# Patient Record
Sex: MALE | Race: ASIAN | Employment: FULL TIME | URBAN - METROPOLITAN AREA
[De-identification: names, ages, dates, MRNs, and addresses within clinical notes are randomized per-mention and may not be internally consistent; named-entity substitution may affect disease eponyms.]

---

## 2024-10-31 ENCOUNTER — OFFICE VISIT (OUTPATIENT)
Dept: FAMILY MEDICINE CLINIC | Facility: CLINIC | Age: 39
End: 2024-10-31
Payer: COMMERCIAL

## 2024-10-31 ENCOUNTER — HOSPITAL ENCOUNTER (OUTPATIENT)
Dept: GENERAL RADIOLOGY | Age: 39
Discharge: HOME | End: 2024-10-31
Attending: FAMILY MEDICINE
Payer: COMMERCIAL

## 2024-10-31 VITALS
HEART RATE: 70 BPM | HEIGHT: 68 IN | BODY MASS INDEX: 34.1 KG/M2 | SYSTOLIC BLOOD PRESSURE: 124 MMHG | WEIGHT: 225 LBS | RESPIRATION RATE: 18 BRPM | OXYGEN SATURATION: 98 % | DIASTOLIC BLOOD PRESSURE: 74 MMHG

## 2024-10-31 DIAGNOSIS — R73.03 PREDIABETES: ICD-10-CM

## 2024-10-31 DIAGNOSIS — Z00.00 WELLNESS EXAMINATION: Primary | ICD-10-CM

## 2024-10-31 DIAGNOSIS — I83.90 VARICOSE VEIN: ICD-10-CM

## 2024-10-31 DIAGNOSIS — M79.672 PAIN OF LEFT HEEL: ICD-10-CM

## 2024-10-31 DIAGNOSIS — Z00.00 LABORATORY EXAM ORDERED AS PART OF ROUTINE GENERAL MEDICAL EXAMINATION: ICD-10-CM

## 2024-10-31 PROCEDURE — 73650 X-RAY EXAM OF HEEL: CPT | Performed by: FAMILY MEDICINE

## 2024-10-31 NOTE — PATIENT INSTRUCTIONS
Health Screening Guidelines, Men Ages 18 to 39   Screening tests and health counseling are a key part of managing your health. A screening test is done to find disorders or diseases in people who don't have any symptoms. Screening tests are not used to diagnose. They are used to find out if more testing is needed. The goal may be to find a disease early so it can be treated with more success. Or the goal may be to find a disease early so you can make lifestyle changes. You may need regular checkups to help you reduce your risk of disease.   Below are guidelines for men ages 18 to 39. Talk with your healthcare provider. Make sure you’re up-to-date on what you need.   We understand gender is a spectrum. We may use gendered terms to talk about anatomy and health risk. Please use this information in a way that works best for you and your provider as you talk about your care.   Screening  Who needs it  How often    Alcohol misuse All men in this age group  At routine exams   Blood pressure All men in this age group  Once a year if your blood pressure is normal. Normal blood pressure is less than 120/80 mm Hg. If your blood pressure is higher than this, follow the advice of your healthcare provider.    Prediabetes and type 2 diabetes  Men ages 35 to 70 who are overweight or obese  At least every 3 years (yearly if blood sugar has already started to rise)    Hepatitis C All men ages 18 to 79  At routine exams   High cholesterol or triglycerides  All men ages 20 and older, and younger men at high risk for coronary artery disease.  At least every 5 years    HIV All men At routine exams   Obesity All men in this age group  At routine exams   Syphilis Men at higher risk for infection. Talk with your healthcare provider.  At routine exams   Tuberculosis Men at higher risk for infection. Talk with your healthcare provider.  Ask your healthcare provider    Vision All men in this age group  Every 5 to 10 years if no risk factors  for eye disease    Health counseling  Who needs it  How often    Diet and exercise All men in this age group  At routine exams   Use of tobacco and the health effects it can cause  All men in this age group  Every visit   Sexually transmitted infection (STI) prevention  Men who are sexually active  At routine exams   Skin cancer All men in this age group  At routine exams. You may be reminded to avoid outdoor tanning and tanning beds.    [employer] last reviewed this educational content on 7/1/2022 © 2000-2023 [employer]. All rights reserved. This information is not intended as a substitute for professional medical care. Always follow your healthcare professional's instructions.

## 2024-10-31 NOTE — PROGRESS NOTES
HPI:   Ann presents for an annual wellness visit.     Reports L heel pain ongoing for the past few weeks. No trauma. Has not tried orthotics, switching shoes. Does report sharp pains when walking/standing for prolonged periods. No radiation. No swelling, skin changes.     Has varicose veins b/l. Wants to get treatment for this. Does report pain, heavy sensation in legs when walking/standing for prolonged periods.     Allergies:   No Known Allergies        CURRENT MEDICATIONS    Cholecalciferol (VITAMIN D3) 25 MCG (1000 UT) Oral Cap       Multiple Vitamin (MULTIVITAMIN ADULT OR)       Esomeprazole Magnesium (NEXIUM 24HR) 20 MG Oral Capsule Delayed Release           HISTORICAL INFORMATION   History reviewed. No pertinent past medical history.     History reviewed. No pertinent surgical history.        SOCIAL HISTORY   Social Drivers of Health     Tobacco Use: Low Risk  (10/31/2024)    Patient History     Smoking Tobacco Use: Never     Smokeless Tobacco Use: Never     Passive Exposure: Not on file   Financial Resource Strain: Not on file   Food Insecurity: Not on file   Transportation Needs: Not on file   Physical Activity: Not on file   Stress: Not on file   Social Connections: Not on file   Domestic Safety: Unknown (8/31/2024)    Domestic Safety     Domestic Safety - Pt Reported (Most Recent Flow): Not on file     Domestic Safety - Signs of abuse/neglect: Not on file   Depression: Not at risk (10/31/2024)    PHQ-2     PHQ-2 Score: 0   Housing Stability: Not on file   Utilities: Not on file   Access to Medications: Not on file   Health Literacy: Not on file            REVIEW OF SYSTEMS:     Constitutional: negative  Eyes: negative  ENT: negative  Respiratory: negative  Cardiovascular: negative  Gastrointestinal: negative  Integument/Breast: see HPI  Genitourinary: negative  Heme/Lymph: negative  Musculoskeletal: see HPI  Neurological: negative  Psych: negative  Endocrine: negative  Allergic/Immune:  negative    EXAM:     Vitals:    10/31/24 1404   BP: 124/74   Pulse: 70   Resp: 18     Body mass index is 34.21 kg/m².  Wt Readings from Last 6 Encounters:   10/31/24 225 lb (102.1 kg)         General: alert, appears stated age, and cooperative, obese  Head: Normocephalic, without obvious abnormality, atraumatic  Eyes: negative  Ears: normal TM's and external ear canals both ears  Nose: Nares normal. Septum midline. Mucosa normal. No drainage or sinus tenderness.  Throat: lips, mucosa, and tongue normal; teeth and gums normal  Neck: no adenopathy, supple, symmetrical, trachea midline, and thyroid not enlarged, symmetric, no tenderness/mass/nodules  Heart: S1, S2 normal, no murmur, click, rub or gallop, regular rate and rhythm  Lungs: clear to auscultation bilaterally  Chest wall: no tenderness  Abdomen: soft, non-tender; bowel sounds normal; no masses,  no organomegaly  : deferred  Back: negative  Extremities: extremities normal, atraumatic, no cyanosis or edema. Tenderness at medial aspect of L heel. No erythema warmth swelling noted. L foot ROM wnl. Strength wnl  Pulses: 2+ and symmetric  Skin: b/l varicose veins  Lymph Nodes: No LAD  Neurologic: Grossly normal    ASSESSMENT AND PLAN:   Clem was seen today for physical.    Diagnoses and all orders for this visit:    1. Wellness examination  -Immunizations: Declines vaccines    -Metabolic: BMI 34. BP wnl. Due for annual labs   -Cancer screening: none indicated   -Communicable disease: low risk   -Mental health: no concerns   -Other preventative: follow with dentistry and optometry.   -Lifestyle: Follow a well balanced healthy diet with emphasis on fruits, vegetables, whole grains, lean meats. Limit processed and junk foods. Aim for at least 150 minutes of moderate intensity exercise weekly. Make sure you are staying adequately hydrated. Aim to get 7-9 hours of sleep nightly.       2. Laboratory exam ordered as part of routine general medical examination  - CBC  With Differential With Platelet; Future  - Comp Metabolic Panel (14); Future  - Lipid Panel; Future  - TSH W Reflex To Free T4; Future    3. Varicose vein  Refer to surgery   - Surgery Referral - In Network    4. Pain of left heel  Will check XR  Switch to supportive shoes. Can try otc orthotics for added support. Avoid prolonged standing/walking. Advised on ice, nsaids as needed.   Consider podiatry referral if needed.   - XR HEEL (CALCANEUS) (MIN 2 VIEWS), LEFT (CPT=73650); Future    5. Prediabetes  Stable, check A1c. Follow low carb diet, regular exercise.   - Hemoglobin A1C; Future          Health Maintenance:  Health Maintenance Due   Topic Date Due    Annual Physical  Never done    DTaP,Tdap,and Td Vaccines (1 - Tdap) Never done    COVID-19 Vaccine (1 - 2023-24 season) Never done    Influenza Vaccine (1) Never done       Patient/Caregiver Education: Patient/Caregiver Education: There are no barriers to learning. Medical education done.   Outcome: Patient verbalizes understanding. Patient is notified to call with any questions, complications, allergies, or worsening or changing symptoms.  Patient is to call with any side effects or complications from the treatments as a result of today.     Problem List:  There is no problem list on file for this patient.

## 2024-11-01 ENCOUNTER — MED REC SCAN ONLY (OUTPATIENT)
Dept: FAMILY MEDICINE CLINIC | Facility: CLINIC | Age: 39
End: 2024-11-01

## 2024-12-21 ENCOUNTER — LAB ENCOUNTER (OUTPATIENT)
Dept: LAB | Age: 39
End: 2024-12-21
Attending: FAMILY MEDICINE
Payer: COMMERCIAL

## 2024-12-21 DIAGNOSIS — Z00.00 LABORATORY EXAM ORDERED AS PART OF ROUTINE GENERAL MEDICAL EXAMINATION: ICD-10-CM

## 2024-12-21 DIAGNOSIS — R73.03 PREDIABETES: ICD-10-CM

## 2024-12-21 LAB
ALBUMIN SERPL-MCNC: 4.1 G/DL (ref 3.2–4.8)
ALBUMIN/GLOB SERPL: 1.3 {RATIO} (ref 1–2)
ALP LIVER SERPL-CCNC: 75 U/L
ALT SERPL-CCNC: 21 U/L
ANION GAP SERPL CALC-SCNC: 4 MMOL/L (ref 0–18)
AST SERPL-CCNC: 25 U/L (ref ?–34)
BASOPHILS # BLD AUTO: 0.06 X10(3) UL (ref 0–0.2)
BASOPHILS NFR BLD AUTO: 1.3 %
BILIRUB SERPL-MCNC: 0.4 MG/DL (ref 0.3–1.2)
BUN BLD-MCNC: 16 MG/DL (ref 9–23)
CALCIUM BLD-MCNC: 9.4 MG/DL (ref 8.7–10.4)
CHLORIDE SERPL-SCNC: 108 MMOL/L (ref 98–112)
CHOLEST SERPL-MCNC: 149 MG/DL (ref ?–200)
CO2 SERPL-SCNC: 28 MMOL/L (ref 21–32)
CREAT BLD-MCNC: 1.04 MG/DL
EGFRCR SERPLBLD CKD-EPI 2021: 94 ML/MIN/1.73M2 (ref 60–?)
EOSINOPHIL # BLD AUTO: 0.31 X10(3) UL (ref 0–0.7)
EOSINOPHIL NFR BLD AUTO: 6.9 %
ERYTHROCYTE [DISTWIDTH] IN BLOOD BY AUTOMATED COUNT: 13.1 %
EST. AVERAGE GLUCOSE BLD GHB EST-MCNC: 120 MG/DL (ref 68–126)
FASTING PATIENT LIPID ANSWER: YES
FASTING STATUS PATIENT QL REPORTED: YES
GLOBULIN PLAS-MCNC: 3.2 G/DL (ref 2–3.5)
GLUCOSE BLD-MCNC: 98 MG/DL (ref 70–99)
HBA1C MFR BLD: 5.8 % (ref ?–5.7)
HCT VFR BLD AUTO: 42.7 %
HDLC SERPL-MCNC: 44 MG/DL (ref 40–59)
HGB BLD-MCNC: 14.1 G/DL
IMM GRANULOCYTES # BLD AUTO: 0.01 X10(3) UL (ref 0–1)
IMM GRANULOCYTES NFR BLD: 0.2 %
LDLC SERPL CALC-MCNC: 89 MG/DL (ref ?–100)
LYMPHOCYTES # BLD AUTO: 1.25 X10(3) UL (ref 1–4)
LYMPHOCYTES NFR BLD AUTO: 27.8 %
MCH RBC QN AUTO: 28.4 PG (ref 26–34)
MCHC RBC AUTO-ENTMCNC: 33 G/DL (ref 31–37)
MCV RBC AUTO: 85.9 FL
MONOCYTES # BLD AUTO: 0.32 X10(3) UL (ref 0.1–1)
MONOCYTES NFR BLD AUTO: 7.1 %
NEUTROPHILS # BLD AUTO: 2.54 X10 (3) UL (ref 1.5–7.7)
NEUTROPHILS # BLD AUTO: 2.54 X10(3) UL (ref 1.5–7.7)
NEUTROPHILS NFR BLD AUTO: 56.7 %
NONHDLC SERPL-MCNC: 105 MG/DL (ref ?–130)
OSMOLALITY SERPL CALC.SUM OF ELEC: 291 MOSM/KG (ref 275–295)
PLATELET # BLD AUTO: 214 10(3)UL (ref 150–450)
POTASSIUM SERPL-SCNC: 4.2 MMOL/L (ref 3.5–5.1)
PROT SERPL-MCNC: 7.3 G/DL (ref 5.7–8.2)
RBC # BLD AUTO: 4.97 X10(6)UL
SODIUM SERPL-SCNC: 140 MMOL/L (ref 136–145)
TRIGL SERPL-MCNC: 82 MG/DL (ref 30–149)
TSI SER-ACNC: 3.14 UIU/ML (ref 0.55–4.78)
VLDLC SERPL CALC-MCNC: 13 MG/DL (ref 0–30)
WBC # BLD AUTO: 4.5 X10(3) UL (ref 4–11)

## 2024-12-21 PROCEDURE — 83036 HEMOGLOBIN GLYCOSYLATED A1C: CPT

## 2024-12-21 PROCEDURE — 80061 LIPID PANEL: CPT

## 2024-12-21 PROCEDURE — 36415 COLL VENOUS BLD VENIPUNCTURE: CPT

## 2024-12-21 PROCEDURE — 85025 COMPLETE CBC W/AUTO DIFF WBC: CPT

## 2024-12-21 PROCEDURE — 84443 ASSAY THYROID STIM HORMONE: CPT

## 2024-12-21 PROCEDURE — 80053 COMPREHEN METABOLIC PANEL: CPT

## 2025-01-06 ENCOUNTER — OFFICE VISIT (OUTPATIENT)
Facility: LOCATION | Age: 40
End: 2025-01-06
Payer: COMMERCIAL

## 2025-01-06 VITALS
BODY MASS INDEX: 33.19 KG/M2 | SYSTOLIC BLOOD PRESSURE: 134 MMHG | TEMPERATURE: 99 F | DIASTOLIC BLOOD PRESSURE: 81 MMHG | HEIGHT: 68 IN | OXYGEN SATURATION: 98 % | WEIGHT: 219 LBS | HEART RATE: 73 BPM

## 2025-01-06 DIAGNOSIS — I83.811 VARICOSE VEINS OF RIGHT LOWER EXTREMITY WITH PAIN: Primary | ICD-10-CM

## 2025-01-06 NOTE — H&P
Patient ID: Ann Salamanca is a 39 year old male.    Chief Complaint   Patient presents with    New Patient     NP - Varicose vein on both legs, no symptoms.       HPI: Ann Salamanca is a 39 year old male presents to clinic for evaluation.  Patient reports having a long history of varicose veins.  Approximately 20 years ago, he did undergo an intervention in his right leg and had improvement of his symptoms.  Since then, he has noticed a bulging vein down the medial aspect of his right thigh as well as varicosities in his left leg.  There is approximately a 2 cm nodule just below his right knee which he has pain.  He denies any swelling.  He does report heaviness in his legs, worse in his right leg.  He denies any blood clots.    Workup: None      Past Medical History  History reviewed. No pertinent past medical history.    Past Surgical History  History reviewed. No pertinent surgical history.    Medications  Current Outpatient Medications   Medication Sig Dispense Refill    Cholecalciferol (VITAMIN D3) 25 MCG (1000 UT) Oral Cap       Multiple Vitamin (MULTIVITAMIN ADULT OR)       Esomeprazole Magnesium (NEXIUM 24HR) 20 MG Oral Capsule Delayed Release          Allergies  Allergies[1]    Social History  History   Smoking Status    Never   Smokeless Tobacco    Never     History   Alcohol Use    2.0 standard drinks of alcohol/week    2 Standard drinks or equivalent per week     History   Drug Use Unknown       Family History  Family History   Problem Relation Age of Onset    Diabetes Mother     Diabetes Brother        Review of Systems  Review of Systems   Constitutional: Negative.    Respiratory: Negative.     Cardiovascular: Negative.    Gastrointestinal: Negative.        Exam  Vitals:    01/06/25 0903   BP: 134/81   Pulse: 73   Temp: 98.8 °F (37.1 °C)     Physical Exam  Constitutional:       Appearance: Normal appearance.   Cardiovascular:      Rate and Rhythm: Normal rate.   Pulmonary:      Effort: Pulmonary effort  is normal.   Musculoskeletal:         General: Normal range of motion.   Skin:     General: Skin is warm and dry.             Comments: Red denotes areas of large varicosities, ranging between 5 to 20 mm   Neurological:      Mental Status: He is alert and oriented to person, place, and time.                                         Assessment/Plan  Assessment   Problem List Items Addressed This Visit          Cardiac and Vasculature    Varicose veins of right lower extremity with pain - Primary    Relevant Orders    US VENOUS INSUFFICIENCY (REFLUX) RIGHT LOWER EXT (FBQ=28505)       Ann Salamanca is a 39 year old male with varicose veins of his right leg with pain    On physical exam, patient has varicose veins of his legs bilaterally  His symptoms are limited to his right leg especially along the long varicosity down the medial aspect  He also has approximately 2 cm venous nodule that is tender with deep palpation  He reports heaviness especially at the end of the day  I believe patient is experiencing venous insufficiency  Will start with venous insufficiency ultrasound to rule out reflux  Patient will also do.  If conservative treatment with compression socks, elevation, and walking  Prescription for medical grade compression knee-high and thigh-high's provided  Patient is to wear these compression socks most days of the week including when he is very active or sitting for long periods  Patient should also elevate his legs when at home and when sleeping  Patient should also walk for approximately 20 to 30 minutes every day  Patient will follow-up in 6 weeks with ultrasound imaging  Further treatment planning will be done then    Patient is to call the office for any questions or concerns      Tori Oconnell MD  General Surgery  [facility]     CC:  Rudy Fontaine MD         [1] No Known Allergies

## 2025-01-07 ENCOUNTER — HOSPITAL ENCOUNTER (OUTPATIENT)
Dept: ULTRASOUND IMAGING | Facility: HOSPITAL | Age: 40
Discharge: HOME | End: 2025-01-07
Attending: STUDENT IN AN ORGANIZED HEALTH CARE EDUCATION/TRAINING PROGRAM
Payer: COMMERCIAL

## 2025-01-07 DIAGNOSIS — I83.811 VARICOSE VEINS OF RIGHT LOWER EXTREMITY WITH PAIN: ICD-10-CM

## 2025-01-07 PROCEDURE — 93971 EXTREMITY STUDY: CPT | Performed by: STUDENT IN AN ORGANIZED HEALTH CARE EDUCATION/TRAINING PROGRAM

## 2025-02-25 ENCOUNTER — OFFICE VISIT (OUTPATIENT)
Dept: FAMILY MEDICINE CLINIC | Facility: CLINIC | Age: 40
End: 2025-02-25
Payer: COMMERCIAL

## 2025-02-25 VITALS
HEART RATE: 63 BPM | SYSTOLIC BLOOD PRESSURE: 129 MMHG | DIASTOLIC BLOOD PRESSURE: 81 MMHG | RESPIRATION RATE: 18 BRPM | OXYGEN SATURATION: 98 % | TEMPERATURE: 97 F

## 2025-02-25 DIAGNOSIS — Z01.89 PATIENT REQUEST FOR DIAGNOSTIC TESTING: ICD-10-CM

## 2025-02-25 DIAGNOSIS — Z20.828 EXPOSURE TO THE FLU: ICD-10-CM

## 2025-02-25 DIAGNOSIS — R68.89 FLU-LIKE SYMPTOMS: ICD-10-CM

## 2025-02-25 DIAGNOSIS — J02.9 SORE THROAT: Primary | ICD-10-CM

## 2025-02-25 LAB
CONTROL LINE PRESENT WITH A CLEAR BACKGROUND (YES/NO): YES YES/NO
KIT LOT #: NORMAL NUMERIC
STREP GRP A CUL-SCR: NEGATIVE

## 2025-02-25 PROCEDURE — 99213 OFFICE O/P EST LOW 20 MIN: CPT | Performed by: NURSE PRACTITIONER

## 2025-02-25 PROCEDURE — 87880 STREP A ASSAY W/OPTIC: CPT | Performed by: NURSE PRACTITIONER

## 2025-02-25 NOTE — PROGRESS NOTES
CHIEF COMPLAINT:     Chief Complaint   Patient presents with    Sore Throat     Cold flu, sore throat symptoms - Entered by patient       HPI:   Ann Salamanca is a 39 year old male who presents for ill symptoms for 1 week. Reports flu like symptoms that improved, now with lingering sore throat. Concerned regarding strep throat. Had fever in beginning which resolved. Was taking dayquil/nyquil. Family members had flu.     Current Outpatient Medications   Medication Sig Dispense Refill    Cholecalciferol (VITAMIN D3) 25 MCG (1000 UT) Oral Cap       Multiple Vitamin (MULTIVITAMIN ADULT OR)       Esomeprazole Magnesium (NEXIUM 24HR) 20 MG Oral Capsule Delayed Release         No past medical history on file.   No past surgical history on file.      Social History     Socioeconomic History    Marital status:    Tobacco Use    Smoking status: Never    Smokeless tobacco: Never   Vaping Use    Vaping status: Never Used   Substance and Sexual Activity    Alcohol use: Yes     Alcohol/week: 2.0 standard drinks of alcohol     Types: 2 Standard drinks or equivalent per week    Drug use: Never   Other Topics Concern    Caffeine Concern Yes     Comment:  carlos    Exercise No    Seat Belt No    Special Diet No    Stress Concern No    Weight Concern No         REVIEW OF SYSTEMS:   GENERAL: feels well otherwise, good appetite  SKIN: no rashes or abnormal skin lesions  HEENT: See HPI  LUNGS: denies shortness of breath or wheezing, See HPI  CARDIOVASCULAR: denies chest pain or palpitations   GI: denies N/V/C or abdominal pain  NEURO: Denies headaches    EXAM:   /81   Pulse 63   Temp 97.2 °F (36.2 °C)   Resp 18   SpO2 98%   GENERAL: well developed, well nourished, in no apparent distress  SKIN: no rashes, no suspicious lesions  HEENT: atraumatic, normocephalic. conjunctiva clear. TM's gray, no bulging, no retraction, + fluid, bony landmarks intact. clear nasal discharge, nasal mucosa erythematous and swollen. Oral  mucosa pink, moist. Posterior pharynx is not erythematous. no exudates. Tonsils 1/4. no Sinus tenderness with palpation.   THROAT:NECK: Supple, non-tender  LUNGS: clear to auscultation   CARDIO: RRR without murmur  EXTREMITIES: no cyanosis, clubbing or edema  LYMP: bilateral anterior cervical lymphadenopathy.    ASSESSMENT AND PLAN:   Ann Salamanca is a 39 year old male who presents with     Ann was seen today for sore throat.    Diagnoses and all orders for this visit:    Sore throat  -     Strep A Assay W/Optic    Exposure to the flu    Flu-like symptoms    Patient request for diagnostic testing    Strep negative.   Supportive care.   Salt water gargles. Can add allergy pill to help PND.   Risks, benefits, and side effects of medication explained and discussed.    Discussed physical exam and hpi with pt. No bacterial focus noted on exam. Pt has reassuring physical exam consistent with viral uri. Lungs clear bilat. No respiratory distress noted. Treatment options discussed with patient and explained in detail. We reviewed symptomatic care at home. The risks, benefits and potential side effects of possible medications were reviewed. Alternatives were discussed. Monitoring parameters and expected course outlined. Patient to call PCP or go to emergency department if symptoms fail to respond as outlined, or worsen in any way. The patient agreed with the plan.  See Patient Handout    The patient indicates understanding of these issues and agrees to the plan.  The patient is asked to follow up with PCP if sx's persist or worsen.

## 2025-03-14 ENCOUNTER — OFFICE VISIT (OUTPATIENT)
Dept: FAMILY MEDICINE CLINIC | Facility: CLINIC | Age: 40
End: 2025-03-14
Payer: COMMERCIAL

## 2025-03-14 VITALS
HEART RATE: 81 BPM | WEIGHT: 218 LBS | DIASTOLIC BLOOD PRESSURE: 72 MMHG | OXYGEN SATURATION: 98 % | SYSTOLIC BLOOD PRESSURE: 112 MMHG | RESPIRATION RATE: 20 BRPM | BODY MASS INDEX: 33.04 KG/M2 | HEIGHT: 68 IN

## 2025-03-14 DIAGNOSIS — I83.90 VARICOSE VEIN: ICD-10-CM

## 2025-03-14 DIAGNOSIS — J01.00 ACUTE NON-RECURRENT MAXILLARY SINUSITIS: Primary | ICD-10-CM

## 2025-03-14 DIAGNOSIS — R73.03 PREDIABETES: ICD-10-CM

## 2025-03-14 PROCEDURE — 99214 OFFICE O/P EST MOD 30 MIN: CPT | Performed by: FAMILY MEDICINE

## 2025-03-14 RX ORDER — AZITHROMYCIN 250 MG/1
TABLET, FILM COATED ORAL
Qty: 6 TABLET | Refills: 0 | Status: SHIPPED | OUTPATIENT
Start: 2025-03-14 | End: 2025-03-19

## 2025-03-14 RX ORDER — BENZONATATE 200 MG/1
200 CAPSULE ORAL 3 TIMES DAILY PRN
Qty: 30 CAPSULE | Refills: 0 | Status: SHIPPED | OUTPATIENT
Start: 2025-03-14

## 2025-03-14 NOTE — PROGRESS NOTES
Subjective:   Ann Salamanca is a 39 year old male who presents for Cough (With chest congestion and mucous 10 days. Coughs hard enough to cause headache. // //The following individual(s) verbally consented to be recorded using ambient AI listening technology and understand that they can each withdraw their consent to this listening technology at a)       History/Other:   History of Present Illness  A 39-year-old male presents for follow-up of upper respiratory symptoms that have persisted for ten days. He reports chest congestion and a productive cough with mucus. He also experiences headaches, particularly when coughing. He has been managing his symptoms with supportive care measures, including salt gargles, but has seen little improvement. He notes significant nasal congestion, to the point where he must breathe through his mouth, and an itchy throat. His symptoms are particularly bothersome at night, disrupting his sleep. He denies any fever. He has not been taking any over-the-counter medications for symptom relief. The patient also mentions that his entire family has been ill, but he is the only one still experiencing symptoms.   Chief Complaint Reviewed and Verified  Nursing Notes Reviewed and   Verified  Tobacco Reviewed  Allergies Reviewed  Medications Reviewed    Medical History Reviewed  Surgical History Reviewed  Family History   Reviewed  Social History Reviewed         Tobacco:  He has never smoked tobacco.    Current Outpatient Medications   Medication Sig Dispense Refill    azithromycin 250 MG Oral Tab Take 2 tablets (500 mg total) by mouth daily for 1 day, THEN 1 tablet (250 mg total) daily for 4 days. 6 tablet 0    benzonatate 200 MG Oral Cap Take 1 capsule (200 mg total) by mouth 3 (three) times daily as needed for cough. 30 capsule 0    Cholecalciferol (VITAMIN D3) 25 MCG (1000 UT) Oral Cap       Multiple Vitamin (MULTIVITAMIN ADULT OR)       Esomeprazole Magnesium (NEXIUM 24HR) 20 MG  Oral Capsule Delayed Release        Review of Systems:  Pertinent items are noted in HPI.    Objective:   /72   Pulse 81   Resp 20   Ht 5' 8\" (1.727 m)   Wt 218 lb (98.9 kg)   SpO2 98%   BMI 33.15 kg/m²  Estimated body mass index is 33.15 kg/m² as calculated from the following:    Height as of this encounter: 5' 8\" (1.727 m).    Weight as of this encounter: 218 lb (98.9 kg).    Physical Exam  Vitals and nursing note reviewed.   Constitutional:       Appearance: Normal appearance.   HENT:      Head: Normocephalic and atraumatic.      Right Ear: Tympanic membrane, ear canal and external ear normal.      Left Ear: Tympanic membrane, ear canal and external ear normal.      Nose: Congestion and rhinorrhea present.      Comments: B/l maxillary sinus tenderness  Eyes:      Pupils: Pupils are equal, round, and reactive to light.   Cardiovascular:      Rate and Rhythm: Normal rate and regular rhythm.      Pulses: Normal pulses.      Heart sounds: Normal heart sounds. No murmur heard.  Pulmonary:      Effort: Pulmonary effort is normal. No respiratory distress.      Breath sounds: Normal breath sounds. No stridor. No wheezing or rhonchi.   Neurological:      Mental Status: He is alert.       Assessment & Plan:   1. Acute non-recurrent maxillary sinusitis (Primary)  -     Azithromycin; Take 2 tablets (500 mg total) by mouth daily for 1 day, THEN 1 tablet (250 mg total) daily for 4 days.  Dispense: 6 tablet; Refill: 0  -     Benzonatate; Take 1 capsule (200 mg total) by mouth 3 (three) times daily as needed for cough.  Dispense: 30 capsule; Refill: 0  2. Prediabetes  3. Varicose vein  -     Surgery Referral - In Network    Assessment & Plan  Acute Sinusitis  Symptoms consistent with acute sinusitis, likely secondary bacterial infection post-viral URI. Discussed azithromycin and OTC symptomatic relief.  - Prescribe azithromycin for 5-6 days.  - Recommend OTC nasal saline rinses.  - Advise Flonase for nasal  inflammation.  - Suggest ibuprofen or acetaminophen for headaches.  - Provide cough medication.  - Encourage Mucinex DM 1200 mg as needed.    Prediabetes  Borderline prediabetic. Emphasized dietary modifications to manage blood glucose.  - Advise reducing sweets, carbohydrates, and high-carb foods.  - Encourage increasing protein intake, maintain regular exercise.    Varicose Veins  Has varicose veins, follow-up with surgeon scheduled.  - Provide referral for Dr. Vines for follow-up on March 24.        Return in about 1 week (around 3/21/2025), or if symptoms worsen or fail to improve.        Rudy Fontaine MD, 3/14/2025, 11:24 AM

## 2025-03-24 ENCOUNTER — OFFICE VISIT (OUTPATIENT)
Facility: LOCATION | Age: 40
End: 2025-03-24
Payer: COMMERCIAL

## 2025-03-24 VITALS
HEART RATE: 71 BPM | DIASTOLIC BLOOD PRESSURE: 84 MMHG | SYSTOLIC BLOOD PRESSURE: 123 MMHG | OXYGEN SATURATION: 97 % | TEMPERATURE: 98 F

## 2025-03-24 DIAGNOSIS — I83.812 VARICOSE VEINS OF LEG WITH PAIN, LEFT: ICD-10-CM

## 2025-03-24 DIAGNOSIS — I83.811 VARICOSE VEINS OF RIGHT LOWER EXTREMITY WITH PAIN: Primary | ICD-10-CM

## 2025-03-24 NOTE — PROGRESS NOTES
Patient ID: Ann Salamanca is a 39 year old male.    Chief Complaint   Patient presents with    Follow - Up     Ep - 6 week f/u-Varicose vein, review venous ultrasound, Pt reports no new symptoms.       HPI: Ann Salamanca is a 39 year old male presents for follow-up.  Since last clinic visit, patient obtain venous insufficiency ultrasound of the right leg.  Patient also reports being sick with influenza for the past month.  He did obtain knee-high compression socks and has been wearing these most days.  He also continues elevating his legs.  He continues to experience heaviness and pain in his right leg.  He is also feeling similar symptoms in his left leg.    Workup:   1/7/2025 right lower extremity venous insufficiency ultrasound  FINDINGS:      :::::DEEP VEINS::::::::::::::::::   REFLUX:   CFV:    Severe reflux   SFV Proximal:    Severe reflux   SFV Mid:    Severe reflux   SFV Distal:    Severe reflux    Popliteal:    Severe reflux   Posterior Tibial:    Competent      CLOTS:             No clots are visualized in the deep veins.         :::::PERFORATORS:::::::::::::::::   DIAMETERS:   Thigh:            Right:  Not visualized   Prox Calf:         Right:  3 mm    Mid Calf:          Right:  Not visualized   Distal Calf:       Right:  4 mm          :::::GREATER SAPHENOUS VEIN:::::   REFLUX:   SFJ:              Severe reflux   Proximal Thigh:    Severe reflux   Mid Thigh:            Severe reflux   Distal Thigh:         Severe reflux   Knee:              Severe reflux   Prox Calf:         Not visualized   Mid Calf:             Severe reflux   Distal Calf:       Severe reflux         DIAMETERS:   SFJ:              Right:  12 mm    Proximal Thigh:    Right:  5 mm    Mid Thigh:            Right:  6 mm    Distal Thigh:         Right:  7 mm    Knee:              Right:  9 mm    Prox Calf:         Right:  Not visualized   Mid Calf:          Right:  5 mm    Distal Calf:       Right:  2 mm       CLOTS:   No clots visualized  in the right greater saphenous vein.         :::::LESSER SAPHENOUS VEINS::::::   REFLUX:      Competent.   LJP:               Competent.   Prox Calf:         Competent.   Mid Calf:          Competent.   Distal Calf:       Competent.         DIAMETERS:   LPJ:              Right:  4 mm    Prox Calf:         Right:  3 mm    Mid Calf:          Right:  4 mm    Distal Calf:       Right:  4 mm       CLOTS:             No clots visualized in the right lesser saphenous vein.         :::::VARIOCOSITY:::::::::::::::::   Right proximal to distal calf posteriorly drains to the lesser saphenous vein with maximum diameter of 4 mm.   Right mid to distal calf medially drains to the greater saphenous vein with maximum diameter of 5 mm.       Past Medical History  History reviewed. No pertinent past medical history.    Past Surgical History  History reviewed. No pertinent surgical history.    Medications  Current Outpatient Medications   Medication Sig Dispense Refill    benzonatate 200 MG Oral Cap Take 1 capsule (200 mg total) by mouth 3 (three) times daily as needed for cough. 30 capsule 0    Cholecalciferol (VITAMIN D3) 25 MCG (1000 UT) Oral Cap       Multiple Vitamin (MULTIVITAMIN ADULT OR)       Esomeprazole Magnesium (NEXIUM 24HR) 20 MG Oral Capsule Delayed Release          Allergies  Allergies[1]    Social History  History   Smoking Status    Never   Smokeless Tobacco    Never     History   Alcohol Use    2.0 standard drinks of alcohol/week    2 Standard drinks or equivalent per week     History   Drug Use Unknown       Family History  Family History   Problem Relation Age of Onset    Diabetes Mother     Diabetes Brother        Review of Systems  Review of Systems   Constitutional: Negative.    Respiratory: Negative.     Cardiovascular: Negative.    Gastrointestinal: Negative.        Exam  Vitals:    03/24/25 0839   BP: 123/84   Pulse: 71   Temp: 97.9 °F (36.6 °C)     Physical Exam  Constitutional:       Appearance: Normal  appearance.   Cardiovascular:      Rate and Rhythm: Normal rate.   Pulmonary:      Effort: Pulmonary effort is normal.   Musculoskeletal:         General: Normal range of motion.   Skin:     General: Skin is warm and dry.          Neurological:      Mental Status: He is alert and oriented to person, place, and time.                 Assessment/Plan  Assessment   Problem List Items Addressed This Visit          Cardiac and Vasculature    Varicose veins of right lower extremity with pain - Primary     Other Visit Diagnoses       Varicose veins of leg with pain, left        Relevant Orders    US VENOUS INSUFFICIENCY LOWER LEFT (CPT=93971)            Ann Salamanca is a 39 year old male with varicose veins of his right leg with pain, varicose veins of his left leg with pain    Ultrasound result of the right leg reviewed with the patient  Patient has clinical reflux that is severe in his right GSV  Ultrasound images demonstrate a tortuous GSV which is confirmed on physical exam  Patient has been wearing compression socks and elevating his legs but continues to experience symptoms including heaviness and pain  Patient would benefit from Varithena injection of his right GSV  He also has multiple varicosities on his right posterior calf and thigh that would also be a candidate for Varithena injection  Procedure explained to the patient  Risks include bleeding, pain, infection, thrombophlebitis, and need for further intervention    Patient is also complaining of similar symptoms in his left leg  I recommend proceeding with ultrasound imaging to rule out venous insufficiency    Patient is to continue to wear his compression socks, elevate his leg, and walk daily  He is to call the office for any questions or concerns      Tori Oconnell MD  General Surgery  [facility]     CC:  Rudy Fontaine MD         [1] No Known Allergies

## 2025-03-31 ENCOUNTER — PATIENT MESSAGE (OUTPATIENT)
Facility: LOCATION | Age: 40
End: 2025-03-31

## 2025-04-09 ENCOUNTER — DOCUMENTATION ONLY (OUTPATIENT)
Facility: LOCATION | Age: 40
End: 2025-04-09

## 2025-04-09 NOTE — TELEPHONE ENCOUNTER
LVM for pt to call back & confirm procedure date 5/13/25.  Also approval has been received from [facility].

## 2025-04-09 NOTE — PROGRESS NOTES
5/13/2025/ OFFICE PROCEDURE/ DR. KATHARINE GAINES US GUIDED FOAM SCLEROTHERAPY, RIGHT LEG    ICD10: I87.2, I83.811, I83.812  CPT: 32319 (2UNITS), 99862 (2UNITS), 63445 (2UITS), 08954 (2UNITS)  PRIOR AUTH:  APPROVAL# [id number] VALID 5/13/25-8/13/25 PER [facility]    -EN 4/9/25

## 2025-05-13 ENCOUNTER — OFFICE VISIT (OUTPATIENT)
Facility: LOCATION | Age: 40
End: 2025-05-13
Payer: COMMERCIAL

## 2025-05-13 VITALS
TEMPERATURE: 97 F | DIASTOLIC BLOOD PRESSURE: 87 MMHG | SYSTOLIC BLOOD PRESSURE: 134 MMHG | HEART RATE: 75 BPM | OXYGEN SATURATION: 97 %

## 2025-05-13 DIAGNOSIS — I83.811 VARICOSE VEINS OF RIGHT LOWER EXTREMITY WITH PAIN: Primary | ICD-10-CM

## 2025-05-13 PROCEDURE — 36465 NJX NONCMPND SCLRSNT 1 VEIN: CPT | Performed by: STUDENT IN AN ORGANIZED HEALTH CARE EDUCATION/TRAINING PROGRAM

## 2025-05-13 NOTE — PROCEDURES
OPERATIVE NOTE    Ann Salamanca Location: [facility] [id number] MRN [id number]   Admission Date (Not on file) Operation Date 5/13/2025   Attending Physician No att. providers found Operating Physician Tori Oconnell MD     PREOPERATIVE DIAGNOSIS  Varicose veins with pain, right greater saphenous    POSTOPERATIVE DIAGNOSIS  Same    PROCEDURE PERFORMED  Ultrasound-guided Microfoam chemical ablation with Varithena of the right greater saphenous, at the level of distal calf to proximal thigh    SURGEON  Tori Oconnell MD    ASSISTANTS  None    ANESTHESIA  None    INDICATIONS  This is a 40 year old malewho presents to clinic with symptomatic varicose veins.  Patient was found to have severe reflux of a tortuous GSV on ultrasound imaging.  Patient had symptoms including heaviness and pain.  Varithena injection was indicated.  The procedure and all the risks were discussed with patient and he consented.    FINDINGS   Successful Varithena injection of the right greater saphenous, confirmed with ultrasound visualization, a total of 12 cc foam sclerosant used    FINDINGS/DESCRIPTION OF PROCEDURE  After informed consent, the patient was positioned on the procedure table.  The right leg was cleaned.  A timeout was performed confirming patient, procedure, and site.  The area of treatment was confirmed with ultrasound guidance.  Access site was determined at the distal aspect of the varicosity.  Using a butterfly needle, access was gained and confirmed with blood draw back.  Varithena was slowly administered at 0.5 cc/s until a total of 12 cc of the foam sclerosant was administered.  Ultrasound was used to confirm spasm of the treated vein with foam in the lumen. After the administration of Varithena, the patient was asked to dorsiflex the ankle to limit flow of foam into perforating and deep veins.  The treated area was cleaned and a dressing applied, including a compression pad and Coban wrap.  The patient tolerated the  procedure well. The patient remained stable throughout the procedure and recovered in the office for an appropriate period of time.    SPECIMENS REMOVED  None    ESTIMATED BLOOD LOSS  None    COMPLICATIONS  None     Tori Oconnell MD

## 2025-05-13 NOTE — PROGRESS NOTES
Patient ID: Ann Salamanca is a 40 year old male.    Chief Complaint   Patient presents with    [facility]     EP-Varithena US Guided Foam Sclero, R Leg             HPI: Ann Salamanca is a 40 year old male presents for Varithena.  No new issues since last appointment.    Workup: None      Past Medical History  Past Medical History[1]    Past Surgical History  Past Surgical History[2]    Medications  Current Medications[3]    Allergies  Allergies[4]    Social History  History   Smoking Status    Never   Smokeless Tobacco    Never     History   Alcohol Use    2.0 standard drinks of alcohol/week    2 Standard drinks or equivalent per week     History   Drug Use Unknown       Family History  Family History[5]    Review of Systems  Review of Systems   Constitutional: Negative.    Respiratory: Negative.     Cardiovascular: Negative.    Gastrointestinal: Negative.        Exam  Vitals:    05/13/25 1409   BP: 134/87   Pulse: 75   Temp: 97 °F (36.1 °C)     Physical Exam  Constitutional:       Appearance: Normal appearance.   Cardiovascular:      Rate and Rhythm: Normal rate.   Pulmonary:      Effort: Pulmonary effort is normal.   Musculoskeletal:         General: Normal range of motion.   Skin:     General: Skin is warm and dry.          Neurological:      Mental Status: He is alert and oriented to person, place, and time.           Assessment/Plan  Assessment   Problem List Items Addressed This Visit          Cardiac and Vasculature    Varicose veins of right lower extremity with pain - Primary       Ann Salamanca is a 40 year old male with varicose veins of his right lower extremity and with pain    Patient underwent successful Varithena injection of the right greater saphenous, see separate procedure note for details  Patient is to keep his dressing in place for 2 days  Once his dressing is removed, he should wear his thigh-high compression socks most days of the week for the next 2 weeks  He should monitor for  thrombophlebitis  If he has any symptoms, he can take Tylenol, ibuprofen, and ice to the area  Patient has ultrasound for the left leg ordered  He should follow-up once ultrasound has been completed    He can call the office for any questions or concerns      Tori Oconnell MD  General Surgery  [facility]     CC:  Rudy Fontaine MD         [1] History reviewed. No pertinent past medical history.  [2] History reviewed. No pertinent surgical history.  [3]   Current Outpatient Medications   Medication Sig Dispense Refill    benzonatate 200 MG Oral Cap Take 1 capsule (200 mg total) by mouth 3 (three) times daily as needed for cough. 30 capsule 0    Cholecalciferol (VITAMIN D3) 25 MCG (1000 UT) Oral Cap       Multiple Vitamin (MULTIVITAMIN ADULT OR)       Esomeprazole Magnesium (NEXIUM 24HR) 20 MG Oral Capsule Delayed Release      [4] No Known Allergies  [5]   Family History  Problem Relation Age of Onset    Diabetes Mother     Diabetes Brother

## 2025-05-30 ENCOUNTER — OFFICE VISIT (OUTPATIENT)
Dept: FAMILY MEDICINE CLINIC | Facility: CLINIC | Age: 40
End: 2025-05-30
Payer: COMMERCIAL

## 2025-05-30 VITALS
HEART RATE: 76 BPM | OXYGEN SATURATION: 98 % | DIASTOLIC BLOOD PRESSURE: 78 MMHG | RESPIRATION RATE: 18 BRPM | HEIGHT: 68 IN | WEIGHT: 216 LBS | BODY MASS INDEX: 32.74 KG/M2 | SYSTOLIC BLOOD PRESSURE: 126 MMHG

## 2025-05-30 DIAGNOSIS — J30.2 SEASONAL ALLERGIES: ICD-10-CM

## 2025-05-30 DIAGNOSIS — R21 RASH AND NONSPECIFIC SKIN ERUPTION: Primary | ICD-10-CM

## 2025-05-30 DIAGNOSIS — R73.03 PREDIABETES: ICD-10-CM

## 2025-05-30 PROCEDURE — 99214 OFFICE O/P EST MOD 30 MIN: CPT | Performed by: FAMILY MEDICINE

## 2025-05-30 RX ORDER — METHYLPREDNISOLONE 4 MG/1
TABLET ORAL
Qty: 21 EACH | Refills: 0 | Status: SHIPPED | OUTPATIENT
Start: 2025-05-30

## 2025-05-30 RX ORDER — MONTELUKAST SODIUM 10 MG/1
10 TABLET ORAL NIGHTLY
Qty: 30 TABLET | Refills: 2 | Status: SHIPPED | OUTPATIENT
Start: 2025-05-30

## 2025-05-30 NOTE — PROGRESS NOTES
The following individual(s) verbally consented to be recorded using ambient AI listening technology and understand that they can each withdraw their consent to this listening technology at any point by asking the clinician to turn off or pause the recording:    Patient name: Ann CHRISTIAN Jadyn  Additional names:

## 2025-05-30 NOTE — PROGRESS NOTES
Subjective:   Ann Salamanca is a 40 year old male who presents for Rash (Left arm, neck/chest behind right ear, pt states is itching. Rash appeared 5 days ago. )     History/Other:   History of Present Illness  Ann Salamanca is a 40 year old male who presents with a rash on his chest and neck.    He noticed the rash on Sunday, primarily located on his chest and neck, which sometimes itches but is not painful. The rash becomes red after showering and has been spreading to his neck and back. He has been using lotions and powders but denies any new soaps or lotions.    He has been taking Zyrtec for about a month for outdoor allergies but stopped when the rash appeared. He experiences seasonal eye allergies, causing redness and itching, and uses over-the-counter eye drops. He was also prescribed medicated tobramycin/dexamethasone gtts last year by ophthalmologist as allergies were severe but was advised that this was to be used very sparingly. His allergies are typically worse from mid-March to May.    He recently underwent a chemical ablation procedure for varicose veins two weeks ago, which he associates with some redness and itchiness. He mentions wearing stockings and using lotions to manage symptoms.     Chief Complaint Reviewed and Verified  Nursing Notes Reviewed and   Verified  Allergies Reviewed  Medications Reviewed         Tobacco:  He has never smoked tobacco.    Current Medications[1]    PHQ-2 SCORE: 0  , done 5/30/2025          Review of Systems:  Pertinent items are noted in HPI.      Objective:   /78   Pulse 76   Resp 18   Ht 5' 8\" (1.727 m)   Wt 216 lb (98 kg)   SpO2 98%   BMI 32.84 kg/m²  Estimated body mass index is 32.84 kg/m² as calculated from the following:    Height as of this encounter: 5' 8\" (1.727 m).    Weight as of this encounter: 216 lb (98 kg).  Physical Exam  GENERAL: Alert, cooperative, well developed, no acute distress  HEENT: Normocephalic  ABDOMEN: Soft, non-tender,  non-distended, without organomegaly, Normal bowel sounds  EXTREMITIES: No cyanosis or edema  NEUROLOGICAL: Cranial nerves grossly intact, Moves all extremities without gross motor or sensory deficit  SKIN: Very faint erythematous on chest and neck, has a few erythematous pinpoint papular lesions visualized on neck and left palm/wrist region. No associated tenderness, fluctuance. No scaly, excoriated features. No signs of any arthropod bites.       Assessment & Plan:   1. Rash and nonspecific skin eruption (Primary)  -     methylPREDNISolone; As directed.  Dispense: 21 each; Refill: 0  2. Seasonal allergies  -     Montelukast Sodium; Take 1 tablet (10 mg total) by mouth nightly.  Dispense: 30 tablet; Refill: 2  3. Prediabetes  -     Hemoglobin A1C; Future; Expected date: 05/30/2025  -     Comp Metabolic Panel (14); Future; Expected date: 05/30/2025  -     Lipid Panel; Future; Expected date: 05/30/2025    Assessment & Plan  Rash  Unclear of underlying etiology. Has history of seasonal allergies. Will trial steroid pack. Reviewed medication administration, side effects.   - Instructed to report if rash does not improve.    Seasonal allergies  Chronic allergic rhinitis with seasonal exacerbations. Managed with Zyrtec, but symptoms persist Long-term antibiotic and steroid eye drops not recommended.  - Continue Zyrtec daily.  - Prescribe montelukast as needed during seasonal allergy periods.  - Recommend over-the-counter eye drops like Pataday.  - Consider allergist referral if symptoms worsen or for specific allergy testing.    Prediabetes  Prediabetes requires monitoring.  - Order blood tests for A1c and cholesterol.        Return if symptoms worsen or fail to improve.        Rudy Fontaine MD, 5/30/2025, 12:09 PM             [1]   Current Outpatient Medications   Medication Sig Dispense Refill    methylPREDNISolone (MEDROL) 4 MG Oral Tablet Therapy Pack As directed. 21 each 0    montelukast 10 MG Oral Tab Take 1  tablet (10 mg total) by mouth nightly. 30 tablet 2    Cholecalciferol (VITAMIN D3) 25 MCG (1000 UT) Oral Cap       Multiple Vitamin (MULTIVITAMIN ADULT OR)       Esomeprazole Magnesium (NEXIUM 24HR) 20 MG Oral Capsule Delayed Release

## 2025-07-08 ENCOUNTER — TELEPHONE (OUTPATIENT)
Dept: FAMILY MEDICINE CLINIC | Facility: CLINIC | Age: 40
End: 2025-07-08

## 2025-07-08 DIAGNOSIS — J30.2 SEASONAL ALLERGIES: Primary | ICD-10-CM

## 2025-07-08 NOTE — TELEPHONE ENCOUNTER
Dr. Fontaine: patient requesting referral to allergist. Referral pended to Dr. Wisdom       Last office visit 5/30/25 office visit note   Seasonal allergies  Chronic allergic rhinitis with seasonal exacerbations. Managed with Zyrtec, but symptoms persist Long-term antibiotic and steroid eye drops not recommended.  - Continue Zyrtec daily.  - Prescribe montelukast as needed during seasonal allergy periods.  - Recommend over-the-counter eye drops like Pataday.  - Consider allergist referral if symptoms worsen or for specific allergy testing.

## 2025-07-08 NOTE — TELEPHONE ENCOUNTER
Patient called asking for a referral to a allergy specialist. Stated the medication you gave him is working some, but not enough.